# Patient Record
(demographics unavailable — no encounter records)

---
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
## 2025-07-28 NOTE — INTERPRETER SERVICES
[Interpreters_IDNumber] : [id number] [Interpreters_FullName] : Tyrese [Interpreters_Relationshiptopatient] : Creole speaking

## 2025-07-28 NOTE — INTERPRETER SERVICES
[Interpreters_IDNumber] : [id number] [Interpreters_FullName] : Tyrese [Interpreters_Relationshiptopatient] : Creole speaking

## 2025-07-28 NOTE — PHYSICAL EXAM
[TextEntry] : Constitutional: Well developed, well appearing, not in acute distress Head: Normocephalic, no lesions Eyes: PERRLA, conjunctiva is NL Ear: Ear canal is normal, tympanic membrane is intact Nose: Nasal turbinates are NL Throat: Clear, no exudates, no lesions Neck: Supple, no masses Chest: Lungs are clear, no rales, no rhonchi, no wheezing Heart: Regular rate, no murmurs, no rubs, no gallops Abdomen: Soft, no tenderness, no masses, bowel sounds are normal : No CVAT Extremities: has an ace band dressing over the L elbow Neuro: AAO x 3, no focal neurological deficit.

## 2025-07-28 NOTE — REVIEW OF SYSTEMS
[TextEntry] : Constitutional: Denies fever, fatigue,  recent changes in the weight Head: Denies headache, dizziness Eyes: Normal vision, denies diplopia, tearing or pain Ears: Denies earache, tinnitus, hearing loss Nose: Denies nasal obstruction,  epistaxis Throat: Denies throat pain Neck: Denies stiffness, muscle tenderness Chest: Denies cough, SOB, wheezing, chest congestion CV: Denies chest pain, palpitation GI: Denies abdominal pain,  c/o constipation on/off Genitourinary: Denies dysuria, urinary urgency Musculoskeletal:  C/o L elbow pain Neuro: Denies changes in mental status

## 2025-07-28 NOTE — PLAN
[FreeTextEntry1] : Ms. MAURO STILES 30 year  female  with a no  PMH   present to the office for a medical clearance.. Exam was performed.  Recommend  to do a blood test today, clearance will be completed after a blood test results